# Patient Record
Sex: MALE | Race: WHITE | NOT HISPANIC OR LATINO | Employment: OTHER | URBAN - METROPOLITAN AREA
[De-identification: names, ages, dates, MRNs, and addresses within clinical notes are randomized per-mention and may not be internally consistent; named-entity substitution may affect disease eponyms.]

---

## 2021-05-25 ENCOUNTER — RECORDS - HEALTHEAST (OUTPATIENT)
Dept: ADMINISTRATIVE | Facility: CLINIC | Age: 61
End: 2021-05-25

## 2021-11-02 ENCOUNTER — HOSPITAL ENCOUNTER (OUTPATIENT)
Dept: CARDIOLOGY | Facility: CLINIC | Age: 61
Discharge: HOME | End: 2021-11-02
Payer: COMMERCIAL

## 2021-11-02 DIAGNOSIS — R00.2 PALPITATIONS: ICD-10-CM

## 2021-11-02 LAB — LVEF ECHO: NORMAL

## 2021-11-02 PROCEDURE — 93306 TTE W/DOPPLER COMPLETE: CPT | Mod: 26 | Performed by: INTERNAL MEDICINE

## 2021-11-02 PROCEDURE — 93306 TTE W/DOPPLER COMPLETE: CPT

## 2021-12-12 ENCOUNTER — HEALTH MAINTENANCE LETTER (OUTPATIENT)
Age: 61
End: 2021-12-12

## 2022-10-02 ENCOUNTER — HEALTH MAINTENANCE LETTER (OUTPATIENT)
Age: 62
End: 2022-10-02

## 2023-02-11 ENCOUNTER — HEALTH MAINTENANCE LETTER (OUTPATIENT)
Age: 63
End: 2023-02-11

## 2024-03-09 ENCOUNTER — HEALTH MAINTENANCE LETTER (OUTPATIENT)
Age: 64
End: 2024-03-09

## 2024-07-06 ENCOUNTER — HOSPITAL ENCOUNTER (EMERGENCY)
Facility: HOSPITAL | Age: 64
Discharge: HOME | End: 2024-07-06
Attending: EMERGENCY MEDICINE | Admitting: EMERGENCY MEDICINE
Payer: COMMERCIAL

## 2024-07-06 VITALS
HEART RATE: 67 BPM | SYSTOLIC BLOOD PRESSURE: 157 MMHG | RESPIRATION RATE: 18 BRPM | WEIGHT: 200 LBS | DIASTOLIC BLOOD PRESSURE: 85 MMHG | OXYGEN SATURATION: 93 %

## 2024-07-06 DIAGNOSIS — L03.90 CELLULITIS OF SKIN WITH LYMPHANGITIS: ICD-10-CM

## 2024-07-06 PROBLEM — S27.329A PULMONARY CONTUSION: Status: ACTIVE | Noted: 2020-08-08

## 2024-07-06 PROBLEM — I51.7 LEFT VENTRICULAR HYPERTROPHY: Status: ACTIVE | Noted: 2024-07-06

## 2024-07-06 PROCEDURE — 250N000013 HC RX MED GY IP 250 OP 250 PS 637: Performed by: EMERGENCY MEDICINE

## 2024-07-06 PROCEDURE — 99283 EMERGENCY DEPT VISIT LOW MDM: CPT

## 2024-07-06 RX ORDER — DOXYCYCLINE 100 MG/1
100 CAPSULE ORAL 2 TIMES DAILY
Qty: 20 CAPSULE | Refills: 0 | Status: SHIPPED | OUTPATIENT
Start: 2024-07-06 | End: 2024-07-16

## 2024-07-06 RX ORDER — DOXYCYCLINE 100 MG/1
100 CAPSULE ORAL ONCE
Status: COMPLETED | OUTPATIENT
Start: 2024-07-06 | End: 2024-07-06

## 2024-07-06 RX ADMIN — DOXYCYCLINE 100 MG: 100 CAPSULE ORAL at 21:35

## 2024-07-06 ASSESSMENT — COLUMBIA-SUICIDE SEVERITY RATING SCALE - C-SSRS
6. HAVE YOU EVER DONE ANYTHING, STARTED TO DO ANYTHING, OR PREPARED TO DO ANYTHING TO END YOUR LIFE?: NO
1. IN THE PAST MONTH, HAVE YOU WISHED YOU WERE DEAD OR WISHED YOU COULD GO TO SLEEP AND NOT WAKE UP?: NO
2. HAVE YOU ACTUALLY HAD ANY THOUGHTS OF KILLING YOURSELF IN THE PAST MONTH?: NO

## 2024-07-07 NOTE — ED PROVIDER NOTES
EMERGENCY DEPARTMENT ENCOUNTER      NAME: Mono Corea  AGE: 63 year old male  YOB: 1960  MRN: [id number]  EVALUATION DATE & TIME: No admission date for patient encounter.    PCP: [facility]    ED PROVIDER: Laury Javier MD    Chief Complaint   Patient presents with    Rash         FINAL IMPRESSION:  1. Cellulitis of skin with lymphangitis          ED COURSE & MEDICAL DECISION MAKING:    Pertinent Labs & Imaging studies reviewed. (See chart for details)  63 year old male with history of LVH who presents to the Emergency Department for evaluation of circular rash on his chest, tonight noticed some streaking erythema to the right axilla.  Exam is notable for evidence of erythema with some streaking erythema to the right axilla.  This is circular, but does not have classic central clearing/targetoid lesion.  He certainly has risk factors for tick bites.  With the erythema I do think that this is consistent with a cellulitis and lymphangitis.  If organ to cover him with antibiotics anyways, we might as well choose doxycycline in case this was from a tick bite.  First dose administered here given time of day and lack of pharmacy availability.  Discharged home with prescriptions for same.      ED Course as of 07/06/24 2144   Sat Jul 06, 2024 2124 I met and evaluated patient       Medical Decision Making    History:  Supplemental history from: Spouse  External Record(s) reviewed: Documented in chart and Outpatient Record: Patient had an echo completed at [facility] on 11/02/21    Work Up:  Chart documentation includes differential considered and any EKGs or imaging independently interpreted by provider, see MDM  In additional to work up documented, I considered the following work up: see MDM    External consultation:  Discussion of management with another provider: N/A    Complicating factors:  Care impacted by chronic illness:  left ventricular hypertrophy.   Care  affected by social determinants of health: Access to care holiday weekend no access to PCP    Disposition considerations: Discharge. I prescribed additional prescription strength medication(s) as charted. See documentation for any additional details.        At the conclusion of the encounter I discussed the results of all of the tests and the disposition. The questions were answered. The patient or family acknowledged understanding and was agreeable with the care plan.      MEDICATIONS GIVEN IN THE EMERGENCY:  Medications   doxycycline monohydrate (MONODOX) capsule 100 mg (100 mg Oral $Given 7/6/24 2135)       NEW PRESCRIPTIONS STARTED AT TODAY'S ER VISIT  Discharge Medication List as of 7/6/2024  9:38 PM        START taking these medications    Details   doxycycline hyclate (VIBRAMYCIN) 100 MG capsule Take 1 capsule (100 mg) by mouth 2 times daily for 10 days, Disp-20 capsule, R-0, Local Print                =================================================================    [facility]    Patient information was obtained from: Patient     Use of Intrepreter: N/A        Mono Corea is a 63 year old male with pertinent medical history of Pulmonary contusion and left ventricular hypertrophy who presents to the ED for evaluation of a rash.     The patient reports a red circular spot/rash on his chest. The rash appeared a couple of days ago(~07/04/24). Tonight, after showering, patient noticed the rash has spread and is approximately 2.5 inches in diameters. The rash is also itchy.  [place], but no known of tic bite. Denies any fevers, nausea or vomiting. No other complaints at this time.         PAST MEDICAL HISTORY:  History reviewed. No pertinent past medical history.    PAST SURGICAL HISTORY:  No past surgical history on file.    CURRENT MEDICATIONS:    None       ALLERGIES:  No Known Allergies    FAMILY HISTORY:  No family history on  file.    SOCIAL HISTORY:        VITALS:  Patient Vitals for the past 24 hrs:   BP Pulse Resp SpO2 Weight   07/06/24 2130 (!) 157/85 67 -- 93 % --   07/06/24 2120 (!) 184/92 71 18 95 % 90.7 kg (200 lb)       PHYSICAL EXAM    General Appearance: Well-appearing, well-nourished, no acute distress   Head:  Normocephalic  Cardio:  Regular rate and rhythm, hypertensive normalized on recheck  Pulm:  No respiratory distress  Extremities: Normal gait  Skin:  Skin warm, dry, circular lesion of erythema on the right chest pectoral area. No axillary lymphadenopathy  Neuro:  Alert and oriented ×3, moving all extremities, no gross sensory defects        The creation of this record is based on the scribe s observations of the work being performed by Laury Javier MD and the provider s statements to them. It was created on her behalf by Amanda Patel, a trained medical scribe. This document has been checked and approved by the attending provider.    Laury Javier MD  Emergency Medicine  [facility] EMERGENCY DEPARTMENT  [address] [place] [id number] [phone]  Dept: [phone]     Laury Javier MD  07/06/24 2863

## 2024-07-07 NOTE — ED NOTES
Patient discharged at 2142 to home; family providing transportation. Patient provided with all discharge paperwork and educational material. All questions answered to patient's satisfaction.

## 2024-07-07 NOTE — ED TRIAGE NOTES
The pt presents for evaluation of a circular red rash to his chest. He noted it on Tuesday and reports it was the size of a quarter. It is approximately 2.5 inches in diameter. This evening he noticed a red line extending from the circular area. No known tic bite, but reports he was recently in [place], in addition to friend's cabin.

## 2025-01-19 ENCOUNTER — HEALTH MAINTENANCE LETTER (OUTPATIENT)
Age: 65
End: 2025-01-19